# Patient Record
Sex: FEMALE | Race: WHITE | NOT HISPANIC OR LATINO | URBAN - METROPOLITAN AREA
[De-identification: names, ages, dates, MRNs, and addresses within clinical notes are randomized per-mention and may not be internally consistent; named-entity substitution may affect disease eponyms.]

---

## 2021-11-22 ENCOUNTER — NEW REFERRAL (OUTPATIENT)
Dept: URBAN - METROPOLITAN AREA CLINIC 52 | Facility: CLINIC | Age: 51
End: 2021-11-22

## 2021-11-22 DIAGNOSIS — H04.123: ICD-10-CM

## 2021-11-22 DIAGNOSIS — H53.2: ICD-10-CM

## 2021-11-22 PROCEDURE — 92002 INTRM OPH EXAM NEW PATIENT: CPT

## 2021-11-22 ASSESSMENT — VISUAL ACUITY
OS_SC: 20/25
OD_PH: 20/20
OD_SC: 20/70-2
OU_SC: J1+

## 2021-11-22 ASSESSMENT — KERATOMETRY
OS_K2POWER_DIOPTERS: 42.50
OS_K1POWER_DIOPTERS: 41.75
OS_AXISANGLE_DEGREES: 080
OD_K2POWER_DIOPTERS: 44.00
OD_K1POWER_DIOPTERS: 43.50
OD_AXISANGLE_DEGREES: 144
OS_AXISANGLE2_DEGREES: 170
OD_AXISANGLE2_DEGREES: 54

## 2021-11-22 ASSESSMENT — TONOMETRY
OD_IOP_MMHG: 16
OS_IOP_MMHG: 16

## 2021-11-22 NOTE — PATIENT DISCUSSION
Patient said about a month ago she started having light headed and dizziness. Spells would last anywhere from part of the day to all day. Now she states it's sporadically. Patient went to PCP. She has a low heart rate, and extremely athletic. Advised for patient to check blood pressure at home when she is experiencing these symptoms. Patient said thyroid lab work came back good. Patient is going to see a cardiologist next. Discussed with patient that they may put in pacemaker later in life to help increase blood flow in heart. No ocular complications found on examination today.  Patient to continue care with PCP and cardiologist. Will see patient back for annual examinations or sooner if needed.

## 2024-09-19 ENCOUNTER — COMPREHENSIVE EXAM (OUTPATIENT)
Dept: URBAN - METROPOLITAN AREA CLINIC 49 | Facility: LOCATION | Age: 54
End: 2024-09-19

## 2024-09-19 DIAGNOSIS — H53.2: ICD-10-CM

## 2024-09-19 DIAGNOSIS — H04.123: ICD-10-CM

## 2024-09-19 DIAGNOSIS — H43.393: ICD-10-CM

## 2024-09-19 DIAGNOSIS — H25.13: ICD-10-CM

## 2024-09-19 PROCEDURE — 99214 OFFICE O/P EST MOD 30 MIN: CPT
